# Patient Record
(demographics unavailable — no encounter records)

---

## 2025-04-30 NOTE — DISCUSSION/SUMMARY
[FreeTextEntry1] :  GYN Annual evaluation today EMB and pap smear sent  We discussed --- AUB and Pelvic pain. All options were discussed but patient was interested in Hysterectomy. Patient verbalized understanding of all explanations, and her questions were answered, and all concerns were addressed. Patient was screened for depression - no signs of clinical depression. PHQ-2 on file RTO in 2 weeks prior to op date.   I, Oanh liu acting as scribe for Dr. Lobo. 04/29/2025   The documentation recorded by the scribe, in my presence, accurately reflects the service I personally performed, and the decisions made by me with my edits as appropriate on 04/30/2025  Danya Lobo MD, FACOG

## 2025-04-30 NOTE — HISTORY OF PRESENT ILLNESS
[FreeTextEntry1] : Initial GYN Evaluation [Mammogramdate] : 5/2024 [BreastSonogramDate] : 5/2024 [PapSmeardate] : 6/2024 [HPVDate] : 6/2024

## 2025-04-30 NOTE — REASON FOR VISIT
[Menorrhagia] : menorrhagia [Abnormal Uterine Bleeding] : abnormal uterine bleeding [Pelvic Pain] : pelvic pain [FreeTextEntry2] : GYN Consultation

## 2025-04-30 NOTE — PROCEDURE
[Cervical Pap Smear] : cervical Pap smear [Liquid Base] : liquid base [Endometrial Biopsy] : Endometrial biopsy [Time out performed] : Pre-procedure time out performed.  Patient's name, date of birth and procedure confirmed. [Consent Obtained] : Consent obtained [Pre-op Evaluation] : Pre-op evaluation [Irregular Bleeding] : irregular uterine bleeding [Risks] : risks [Benefits] : benefits [Alternatives] : alternatives [Patient] : patient [Infection] : infection [Bleeding] : bleeding [Allergic Reaction] : allergic reaction [Uterine Perforation] : uterine perforation [Pain] : pain [N/A] : pregnancy test not applicable [Betadine] : Betadine [None] : none [Required Dilation] : required dilation [Sounded to ___ cm] : sounded to [unfilled] ~Ucm [Moderate] : moderate [Specimen Collected] : collected [Sent to Pathology] : placed in buffered formalin and sent for pathology [Tolerated Well] : Patient tolerated the procedure well [No Complications] : No complications [de-identified] : Dysmenorrhea [LMPDate] : 4/21/25 [de-identified] : Kianna clamp [de-identified] : Anteverted retroflexed

## 2025-04-30 NOTE — PHYSICAL EXAM
[MA] : MA [Appropriately responsive] : appropriately responsive [Alert] : alert [No Acute Distress] : no acute distress [No Lymphadenopathy] : no lymphadenopathy [Soft] : soft [Non-tender] : non-tender [Non-distended] : non-distended [Oriented x3] : oriented x3 [Labia Majora] : normal [Labia Minora] : normal [Normal] : normal [Uterine Adnexae] : non-palpable [Chaperoned Physical Exam] : A chaperone was present in the examining room during all aspects of the physical examination. [FreeTextEntry2] : Oanh [FreeTextEntry6] : bulky uterus on exam

## 2025-04-30 NOTE — PROCEDURE
[Cervical Pap Smear] : cervical Pap smear [Liquid Base] : liquid base [Endometrial Biopsy] : Endometrial biopsy [Time out performed] : Pre-procedure time out performed.  Patient's name, date of birth and procedure confirmed. [Consent Obtained] : Consent obtained [Pre-op Evaluation] : Pre-op evaluation [Irregular Bleeding] : irregular uterine bleeding [Risks] : risks [Benefits] : benefits [Alternatives] : alternatives [Patient] : patient [Infection] : infection [Bleeding] : bleeding [Allergic Reaction] : allergic reaction [Uterine Perforation] : uterine perforation [Pain] : pain [N/A] : pregnancy test not applicable [Betadine] : Betadine [None] : none [Required Dilation] : required dilation [Sounded to ___ cm] : sounded to [unfilled] ~Ucm [Moderate] : moderate [Specimen Collected] : collected [Sent to Pathology] : placed in buffered formalin and sent for pathology [Tolerated Well] : Patient tolerated the procedure well [No Complications] : No complications [de-identified] : Dysmenorrhea [LMPDate] : 4/21/25 [de-identified] : Kianna clamp [de-identified] : Anteverted retroflexed

## 2025-07-01 NOTE — PHYSICAL EXAM
[MA] : MA [Appropriately responsive] : appropriately responsive [Alert] : alert [No Acute Distress] : no acute distress [Oriented x3] : oriented x3 [FreeTextEntry2] : Oanh

## 2025-07-01 NOTE — DISCUSSION/SUMMARY
[FreeTextEntry1] : GYN evaluation today We discussed -- Abnormal MRI, Adenomyosis of the uterus and Fibroid uterus causes and treatment options. PLAN: We discussed MRI confirms adenomyosis, small fibroids, and a 2cm endometrioma on the right ovary. Patient changes pads hourly during 1-2 days of her period, indicating significant blood loss. Previous treatments including Mirena IUD and birth control pills were ineffective or not tolerated. Patient's mother entered menopause at 47, but patient is still menstruating at 49. Discuss hormone therapy options: birth control pills, progesterone pills, Mirena IUD (already tried), Lupron injections, Consider non-hormonal treatments: pain medication (e.g., Motrin), Lysteda (tranexamic acid) for reducing blood flow, Discuss iron supplementation or iron infusion to address potential anemia, Consider endometrial ablation as a less invasive surgical option Discuss hysterectomy as a definitive treatment (will have general surgeon and urology on standby), leaving ovaries if no family history of ovarian cancer, Advised short-term use of Lupron to reduce bleeding and potentially shrink fibroids before surgery if chosen, Advised patient to research Lupron and consider a trial of 3-month injections for up to one year, Discuss risks and benefits of all treatment options, including potential complications of surgery, Allow patient time to consider options and schedule follow-up to discuss decision Patient verbalized understanding of all explanations, and her questions were answered, and all concerns were addressed. Patient was already screened for depression, PHQ-2 on file. RTO when patient decides to discuss further   I, Oanh Hernandez sole acting as scribe for Dr. Lobo. 07/01/2025   The documentation recorded by the scribe, in my presence, accurately reflects the service I personally performed, and the decisions made by me with my edits as appropriate on 07/01/2025  Danya Lobo MD, FACOG